# Patient Record
Sex: FEMALE | Race: WHITE | NOT HISPANIC OR LATINO | Employment: PART TIME | URBAN - METROPOLITAN AREA
[De-identification: names, ages, dates, MRNs, and addresses within clinical notes are randomized per-mention and may not be internally consistent; named-entity substitution may affect disease eponyms.]

---

## 2023-04-19 PROCEDURE — 87077 CULTURE AEROBIC IDENTIFY: CPT | Mod: ORL | Performed by: PHYSICIAN ASSISTANT

## 2023-04-20 ENCOUNTER — LAB REQUISITION (OUTPATIENT)
Dept: LAB | Facility: CLINIC | Age: 73
End: 2023-04-20
Payer: COMMERCIAL

## 2023-04-23 LAB — BACTERIA SKIN AEROBE CULT: ABNORMAL

## 2024-03-06 ENCOUNTER — LAB REQUISITION (OUTPATIENT)
Dept: LAB | Facility: CLINIC | Age: 74
End: 2024-03-06
Payer: COMMERCIAL

## 2024-03-06 DIAGNOSIS — L30.8 OTHER SPECIFIED DERMATITIS: ICD-10-CM

## 2024-03-06 PROCEDURE — 87186 SC STD MICRODIL/AGAR DIL: CPT | Mod: ORL | Performed by: PHYSICIAN ASSISTANT

## 2024-03-07 ENCOUNTER — TRANSFERRED RECORDS (OUTPATIENT)
Dept: HEALTH INFORMATION MANAGEMENT | Facility: CLINIC | Age: 74
End: 2024-03-07
Payer: COMMERCIAL

## 2024-03-08 ENCOUNTER — APPOINTMENT (OUTPATIENT)
Dept: GENERAL RADIOLOGY | Facility: CLINIC | Age: 74
End: 2024-03-08
Attending: EMERGENCY MEDICINE
Payer: COMMERCIAL

## 2024-03-08 ENCOUNTER — HOSPITAL ENCOUNTER (EMERGENCY)
Facility: CLINIC | Age: 74
Discharge: HOME | End: 2024-03-08
Attending: EMERGENCY MEDICINE | Admitting: EMERGENCY MEDICINE
Payer: COMMERCIAL

## 2024-03-08 VITALS
HEIGHT: 64 IN | RESPIRATION RATE: 18 BRPM | SYSTOLIC BLOOD PRESSURE: 154 MMHG | HEART RATE: 67 BPM | TEMPERATURE: 97.9 F | OXYGEN SATURATION: 97 % | DIASTOLIC BLOOD PRESSURE: 60 MMHG

## 2024-03-08 DIAGNOSIS — S91.109S OPEN TOE WOUND, SEQUELA: ICD-10-CM

## 2024-03-08 DIAGNOSIS — I77.6 VASCULITIS (H): ICD-10-CM

## 2024-03-08 PROCEDURE — 73660 X-RAY EXAM OF TOE(S): CPT | Mod: 26 | Performed by: RADIOLOGY

## 2024-03-08 PROCEDURE — 99283 EMERGENCY DEPT VISIT LOW MDM: CPT | Performed by: EMERGENCY MEDICINE

## 2024-03-08 PROCEDURE — 73660 X-RAY EXAM OF TOE(S): CPT | Mod: 50

## 2024-03-08 PROCEDURE — 99284 EMERGENCY DEPT VISIT MOD MDM: CPT | Performed by: EMERGENCY MEDICINE

## 2024-03-08 ASSESSMENT — ACTIVITIES OF DAILY LIVING (ADL)
ADLS_ACUITY_SCORE: 35
ADLS_ACUITY_SCORE: 33
ADLS_ACUITY_SCORE: 33

## 2024-03-08 ASSESSMENT — COLUMBIA-SUICIDE SEVERITY RATING SCALE - C-SSRS
6. HAVE YOU EVER DONE ANYTHING, STARTED TO DO ANYTHING, OR PREPARED TO DO ANYTHING TO END YOUR LIFE?: NO
2. HAVE YOU ACTUALLY HAD ANY THOUGHTS OF KILLING YOURSELF IN THE PAST MONTH?: NO
1. IN THE PAST MONTH, HAVE YOU WISHED YOU WERE DEAD OR WISHED YOU COULD GO TO SLEEP AND NOT WAKE UP?: NO

## 2024-03-08 NOTE — ED PROVIDER NOTES
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 Patient states she has been having bilateral toe wounds since April of 2020. She states these began as bruises in her toes and have now evolved into open lesions. She is concerned as these wounds have not been healing and her toes are now misshapen due to tissue loss. She endorses losing feeling in her toes. She has been in contact with her dermatologist and rheumatologist about these wounds and she states they have concern that she may lose her toes. She states they attempted to get her into a wound care clinic however, were unable to find appointments in the near future. Patient is currently on Cellcept and Prednisone for her autoimmune disease. She is also on antibiotics due to these wounds. She was referred to the ED for evaluation.       Past Medical History  No past medical history on file.  No past surgical history on file.  No current outpatient medications on file.    Allergies   Allergen Reactions    Penicillin G Hives     Family History  No family history on file.  Social History       Past medical history, past surgical history, medications, allergies, family history, and social history were reviewed with the patient. No additional pertinent items.      A complete review of systems was performed with pertinent positives and negatives noted in the HPI, and all other systems negative.    Physical Exam   BP: 138/88  Pulse: 75  Temp: 98  F (36.7  C)  Resp: 16  Height: 162.6 cm (5' 4\")  SpO2: 97 %  Physical Exam  Vitals and nursing note reviewed.   Constitutional:       General: She is not " in acute distress.  Musculoskeletal:        Feet:    Feet:      Comments: There are lesions to the plantar aspect of the right second toe and left third toe distally.  There is no signs of infection.  The toe is well-perfused.  There is no drainage.  There is a callus or eschar over the wound.  The remainder of the foot examination is normal sensation is intact and pulses are intact dorsal pedal and posterior tibial.  Neurological:      Mental Status: She is alert.           ED Course, Procedures, & Data      Procedures       XR Toe Bilateral G/E 2 Views   Final Result   IMPRESSION: No radiographic evidence of osteomyelitis.      DES TAI MD (Joe)            SYSTEM ID:  [id number]             Results for orders placed or performed during the hospital encounter of 03/08/24   XR Toe Bilateral G/E 2 Views     Status: None    Narrative    EXAM: XR TOE BILATERAL G/E 2 VIEWS  3/8/2024 12:39 PM      HISTORY: Poor healing wound    COMPARISON: None    FINDINGS: 3 views of each foot centered on the second-third toes.    No acute osseous abnormality. No osteolysis. Bilateral tarsometatarsal  and naviculocuneiform degenerative change. Soft tissues unremarkable.       Impression    IMPRESSION: No radiographic evidence of osteomyelitis.    DES TAI MD (Joe)         SYSTEM ID:  [id number]     Medications - No data to display  Labs Ordered and Resulted from Time of ED Arrival to Time of ED Departure - No data to display  XR Toe Bilateral G/E 2 Views   Final Result   IMPRESSION: No radiographic evidence of osteomyelitis.      DES TAI MD (Joe)            SYSTEM ID:  [id number]             Critical care was not performed.     Medical Decision Making  The patient's presentation was of low complexity (a stable chronic illness).    The patient's evaluation involved:  ordering and/or review of 2 test(s) in this encounter (x-ray of the right second and left third toe)    The patient's management necessitated  only low risk treatment.    Assessment & Plan    Patient with history of vasculitis and chronic wounds on the right second and left third toe.  Patient is being seen by her dermat[facility].  Here I do not see any thing that would require an emergent consultation.  I did obtain plain films shows no fracture dislocation or evidence for osteomyelitis she is currently on antibiotics by the dermatologist and her vasculitis is being managed with steroids.  I have put in a consultation for both podiatry and wound care and she can follow-up with them as an outpatient.  These digits do not appear to be nonviable at this point no indication for surgical evaluation.    I have reviewed the nursing notes. I have reviewed the findings, diagnosis, plan and need for follow up with the patient.    New Prescriptions    No medications on file       Final diagnoses:   Open toe wound, sequela   Vasculitis (H24)   I, BRENNEN SOARES, am serving as a trained medical scribe to document services personally performed by Chuckie Aparicio MD, based on the provider's statements to me.      IChuckie MD, was physically present and have reviewed and verified the accuracy of this note documented by BRENNEN SOARES.     Chuckie Aparicio MD  [facility] EMERGENCY DEPARTMENT  3/8/2024              Chuckie Aparicio MD  03/08/24 [id number]

## 2024-03-08 NOTE — ED TRIAGE NOTES
Pt ambulatory from home with bilateral toe wounds. Pt was told by MD to come to ED since pt cannot get in to see a wound specialist.

## 2024-03-08 NOTE — DISCHARGE INSTRUCTIONS
Please follow up with Wound care and Podiatry.  I have put in referrals to both of these services and you should be receiving a phone call to schedule an appointment  Continue the medications prescribed by your doctors  Place gauze between the toes to reduce friction

## 2024-03-11 ENCOUNTER — TELEPHONE (OUTPATIENT)
Dept: WOUND CARE | Facility: CLINIC | Age: 74
End: 2024-03-11
Payer: COMMERCIAL

## 2024-03-11 LAB
BACTERIA SKIN AEROBE CULT: ABNORMAL
BACTERIA SKIN AEROBE CULT: ABNORMAL

## 2024-03-11 NOTE — TELEPHONE ENCOUNTER
Called and spoke with the pt to schedule a referral placed by Dr. Aparicio for an open toe wound. Writer wanted to offer next available appt with Dr. Young in [facility]. Pt wants to call back to schedule once she has checked with other wound clinics first to see if she can be seen sooner.    Writer left direct #